# Patient Record
Sex: FEMALE | Race: WHITE | Employment: FULL TIME | URBAN - METROPOLITAN AREA
[De-identification: names, ages, dates, MRNs, and addresses within clinical notes are randomized per-mention and may not be internally consistent; named-entity substitution may affect disease eponyms.]

---

## 2021-11-26 ENCOUNTER — APPOINTMENT (OUTPATIENT)
Dept: CT IMAGING | Age: 26
End: 2021-11-26
Payer: COMMERCIAL

## 2021-11-26 ENCOUNTER — HOSPITAL ENCOUNTER (EMERGENCY)
Age: 26
Discharge: HOME | End: 2021-11-26
Attending: EMERGENCY MEDICINE
Payer: COMMERCIAL

## 2021-11-26 VITALS
BODY MASS INDEX: 23.52 KG/M2 | HEIGHT: 68 IN | SYSTOLIC BLOOD PRESSURE: 132 MMHG | DIASTOLIC BLOOD PRESSURE: 73 MMHG | HEART RATE: 83 BPM | TEMPERATURE: 97.6 F | WEIGHT: 155.2 LBS | RESPIRATION RATE: 14 BRPM | OXYGEN SATURATION: 100 %

## 2021-11-26 DIAGNOSIS — R20.2 TINGLING: Primary | ICD-10-CM

## 2021-11-26 LAB
ANION GAP SERPL CALCULATED.3IONS-SCNC: 13 MMOL/L (ref 3–16)
BASOPHILS ABSOLUTE: 0.1 K/UL (ref 0–0.2)
BASOPHILS RELATIVE PERCENT: 0.6 %
BUN BLDV-MCNC: 10 MG/DL (ref 7–20)
CALCIUM SERPL-MCNC: 10.2 MG/DL (ref 8.3–10.6)
CHLORIDE BLD-SCNC: 100 MMOL/L (ref 99–110)
CO2: 24 MMOL/L (ref 21–32)
CREAT SERPL-MCNC: 0.7 MG/DL (ref 0.6–1.1)
EOSINOPHILS ABSOLUTE: 0.1 K/UL (ref 0–0.6)
EOSINOPHILS RELATIVE PERCENT: 1.2 %
GFR AFRICAN AMERICAN: >60
GFR NON-AFRICAN AMERICAN: >60
GLUCOSE BLD-MCNC: 114 MG/DL (ref 70–99)
HCT VFR BLD CALC: 37.5 % (ref 36–48)
HEMOGLOBIN: 12.5 G/DL (ref 12–16)
LYMPHOCYTES ABSOLUTE: 2.6 K/UL (ref 1–5.1)
LYMPHOCYTES RELATIVE PERCENT: 24.8 %
MCH RBC QN AUTO: 27.2 PG (ref 26–34)
MCHC RBC AUTO-ENTMCNC: 33.3 G/DL (ref 31–36)
MCV RBC AUTO: 81.7 FL (ref 80–100)
MONOCYTES ABSOLUTE: 0.9 K/UL (ref 0–1.3)
MONOCYTES RELATIVE PERCENT: 8.3 %
NEUTROPHILS ABSOLUTE: 6.8 K/UL (ref 1.7–7.7)
NEUTROPHILS RELATIVE PERCENT: 65.1 %
PDW BLD-RTO: 13.6 % (ref 12.4–15.4)
PLATELET # BLD: 264 K/UL (ref 135–450)
PMV BLD AUTO: 7.9 FL (ref 5–10.5)
POTASSIUM REFLEX MAGNESIUM: 4.2 MMOL/L (ref 3.5–5.1)
RBC # BLD: 4.59 M/UL (ref 4–5.2)
SODIUM BLD-SCNC: 137 MMOL/L (ref 136–145)
WBC # BLD: 10.4 K/UL (ref 4–11)

## 2021-11-26 PROCEDURE — 99283 EMERGENCY DEPT VISIT LOW MDM: CPT

## 2021-11-26 PROCEDURE — 70450 CT HEAD/BRAIN W/O DYE: CPT

## 2021-11-26 PROCEDURE — 80048 BASIC METABOLIC PNL TOTAL CA: CPT

## 2021-11-26 PROCEDURE — 85025 COMPLETE CBC W/AUTO DIFF WBC: CPT

## 2021-11-26 PROCEDURE — 36415 COLL VENOUS BLD VENIPUNCTURE: CPT

## 2021-11-26 NOTE — ED NOTES
Patient given  discharge instructions verbal and written, patient verbalized understanding. Alert/oriented X4, Clear speech.   Patient exhibits no distress, ambulates with steady gait per self leaving unit, no further request.     Taiwo Dallas RN  11/26/21 [id number]

## 2021-11-28 NOTE — ED PROVIDER NOTES
TRIAGE CHIEF COMPLAINT:   Chief Complaint   Patient presents with    Numbness     left foot, hand and arm 1 hour ago - resolved       HPI: Marcie Paz is a 26 y.o. female who presents to the emergency department with complaint of waking up this morning at 5 AM stating that she could not move her left foot for 1 to 2 minutes. There was no pain or tingling. She states that she massaged her foot and within 1 to 2 minutes she was able to move it normally. Later today she noted a very faint tingling sensation in her left arm but now states it may be secondary to anxiety. Denies any tingling at this time. She has no current symptoms in her left leg. She denies any numbness or weakness. Denies any gait, speech or swallowing difficulty. She has no neck pain. Denies visual complaint or diplopia. Denies chest pain or palpitations. No fever or chills. The patient states she had a mild posterior headache lasting for a few hours yesterday. She states she has a history of similar headaches in the past but has never been diagnosed with migraines. She is on birth control pills. Denies history of smoking, hypertension or diabetes. REVIEW OF SYSTEMS:   10 systems reviewed. Pertinent positives per HPI. Otherwise noted to be negative. I have reviewed the triage/nursing documentation and agree unless otherwise noted below. PAST MEDICAL HISTORY:   History reviewed. No pertinent past medical history. CURRENT MEDICATIONS:   There are no discharge medications for this patient. SURGICAL HISTORY:   History reviewed. No pertinent surgical history. FAMILY HISTORY:   History reviewed. No pertinent family history. SOCIAL HISTORY:    reports that she has never smoked. She has never used smokeless tobacco. She reports current alcohol use. She reports that she does not use drugs.     ALLERGIES: No Known Allergies    PHYSICAL EXAM:  VITAL SIGNS: /73   Pulse 83   Temp 97.6 °F (36.4 °C) (Oral) Resp 14   Ht 5' 8\" (1.727 m)   Wt 155 lb 3.2 oz (70.4 kg)   SpO2 100%   BMI 23.60 kg/m²   Constitutional:  No acute distress, Non-toxic appearance  HENT: Normocephalic, Atraumatic Oropharynx moist, No oral exudates. TMs are normal.  Eyes:  PERRL, EOMI, Conjunctiva normal, No discharge. Neck: No tenderness, Supple, No lymphadenopathy, No stridor. Cardiovascular:  Normal heart rate, Normal rhythm, No murmurs, No rubs, No gallops. Pulmonary/Chest:  Normal breath sounds, No respiratory distress, No wheezing,  Abdomen:   Soft, No tenderness, No masses, No pulsatile masses  Back:  No tenderness, No CVA tenderness  Extremities:  Normal range of motion, Intact distal pulses, No edema, No tenderness  Neurologic:  Alert & oriented x 3, Speech is clear and appropriate, No upper extremity drift or lower extremity weakness,  Normal sensory function, No facial asymmetry, no truncal or extremity ataxia. Normal gait. NIHSS is 0. Negative test of skew. Romberg's is negative. Skin:  Warm, Dry, No erythema, No rash  Psychiatric:  Affect normal, Mood normal      EKG:    EKG interpreted by myself. Radiology:  CT Head WO Contrast   Final Result      No acute intracranial pathology                     LAB  Labs Reviewed   BASIC METABOLIC PANEL W/ REFLEX TO MG FOR LOW K - Abnormal; Notable for the following components:       Result Value    Glucose 114 (*)     All other components within normal limits    Narrative:     Performed at:  [facility]) - [facility]  [employer],  [email or web address]   Phone [phone]   CBC WITH AUTO DIFFERENTIAL    Narrative:     Performed at:  [facility]) [facility]  [email or web address] [address],  [place]   Phone [phone]       ED COURSE & MEDICAL DECISION MAKING:  Pertinent Labs & Imaging studies reviewed.  (See chart for details)  60-year-old healthy female states she woke up at 5:00 this morning and was unable to move her left foot for 1 to 2 minutes. No pain tingling or pins-and-needles sensation. After she massaged it symptoms resolved but several hours later she had questionable mild tingling in her left arm. No weakness or loss of sensation. Denies current headache but had a headache lasting for a few hours last night typical of her intermittent headaches. No gait, speech, vision or swallowing difficulty. No neck pain. She is neurologically intact with NIHSS of 0, normal gait, negative Romberg's and negative test of skew. I considered the possibility of electrolyte abnormality causing muscle spasms, complicated migraine, TIA and CVA. CT head scan without contrast read by the radiologist and reviewed by myself was a negative study. CBC and BMP were normal.  The patient is currently asymptomatic. I did not feel contrast imaging or MRI was indicated at this point but if she has further symptoms she may need additional evaluation. Symptoms may be secondary to complicated migraine. I feel she is stable for outpatient management. Recommended follow-up with her PCP and return here for worse symptoms. I discussed with Jazmin Ridley the results of the evaluation in the Emergency Department, diagnosis, care, prognosis and the importance of follow-up. The patient is stable for discharge. The patient and/or family are in agreement with the plan and all questions have been answered. Specific discharge instructions were explained, including reasons to return to the emergency department.           (Please note that portions of this note may have been completed with a voice recognition program.  Efforts were made to edit the dictation but occasionally words are mis-transcribed)      FINAL IMPRESSION:  1 --tingling in extremity                 Mack Bhatia MD  11/28/21 [id number]